# Patient Record
Sex: FEMALE | Race: WHITE | NOT HISPANIC OR LATINO | Employment: UNEMPLOYED | URBAN - METROPOLITAN AREA
[De-identification: names, ages, dates, MRNs, and addresses within clinical notes are randomized per-mention and may not be internally consistent; named-entity substitution may affect disease eponyms.]

---

## 2017-06-10 PROBLEM — T50.901A OVERDOSE: Status: ACTIVE | Noted: 2017-06-10

## 2017-06-11 PROBLEM — I95.9 HYPOTENSION: Status: ACTIVE | Noted: 2017-06-11

## 2017-06-11 PROBLEM — T50.902A INTENTIONAL DRUG OVERDOSE: Status: ACTIVE | Noted: 2017-06-10

## 2017-06-11 PROBLEM — Z72.0 TOBACCO ABUSE: Status: ACTIVE | Noted: 2017-06-11

## 2017-06-11 PROBLEM — R79.89 ELEVATED LFTS: Status: ACTIVE | Noted: 2017-06-11

## 2017-06-11 PROBLEM — I10 ESSENTIAL HYPERTENSION: Status: ACTIVE | Noted: 2017-06-11

## 2017-06-12 PROBLEM — F32.A DEPRESSION: Status: ACTIVE | Noted: 2017-06-12

## 2017-06-13 PROBLEM — F10.20 ALCOHOL DEPENDENCE, UNCOMPLICATED: Status: ACTIVE | Noted: 2017-06-13

## 2017-06-13 PROBLEM — I95.9 HYPOTENSION: Status: RESOLVED | Noted: 2017-06-11 | Resolved: 2017-06-13

## 2017-06-13 PROBLEM — F11.20 UNCOMPLICATED OPIOID DEPENDENCE: Status: ACTIVE | Noted: 2017-06-13

## 2017-06-13 PROBLEM — F41.9 ANXIETY DISORDER: Status: ACTIVE | Noted: 2017-06-13

## 2017-06-13 PROBLEM — F13.10 BENZODIAZEPINE ABUSE: Status: ACTIVE | Noted: 2017-06-13

## 2017-06-13 PROBLEM — F13.10 SEDATIVE ABUSE: Status: ACTIVE | Noted: 2017-06-13

## 2017-06-13 PROBLEM — G25.81 RLS (RESTLESS LEGS SYNDROME): Status: ACTIVE | Noted: 2017-06-13

## 2017-11-02 DIAGNOSIS — M54.2 CERVICALGIA: Primary | ICD-10-CM

## 2019-04-11 ENCOUNTER — OFFICE VISIT (OUTPATIENT)
Dept: PULMONOLOGY | Facility: CLINIC | Age: 54
End: 2019-04-11
Payer: MEDICAID

## 2019-04-11 VITALS
DIASTOLIC BLOOD PRESSURE: 76 MMHG | WEIGHT: 121.94 LBS | HEIGHT: 67 IN | OXYGEN SATURATION: 97 % | SYSTOLIC BLOOD PRESSURE: 120 MMHG | HEART RATE: 76 BPM | BODY MASS INDEX: 19.14 KG/M2

## 2019-04-11 DIAGNOSIS — R91.1 PULMONARY NODULE: ICD-10-CM

## 2019-04-11 DIAGNOSIS — J40 BRONCHITIS: ICD-10-CM

## 2019-04-11 DIAGNOSIS — F17.219 CIGARETTE NICOTINE DEPENDENCE WITH NICOTINE-INDUCED DISORDER: ICD-10-CM

## 2019-04-11 DIAGNOSIS — Z12.9 SCREENING FOR CANCER: ICD-10-CM

## 2019-04-11 PROBLEM — F17.210 DEPENDENCE ON NICOTINE FROM CIGARETTES: Status: ACTIVE | Noted: 2017-06-11

## 2019-04-11 PROCEDURE — 99406 BEHAV CHNG SMOKING 3-10 MIN: CPT | Mod: S$PBB,,, | Performed by: INTERNAL MEDICINE

## 2019-04-11 PROCEDURE — 99204 OFFICE O/P NEW MOD 45 MIN: CPT | Mod: 25,S$PBB,, | Performed by: INTERNAL MEDICINE

## 2019-04-11 PROCEDURE — 99999 PR PBB SHADOW E&M-EST. PATIENT-LVL IV: CPT | Mod: PBBFAC,,, | Performed by: INTERNAL MEDICINE

## 2019-04-11 PROCEDURE — 99406 PR TOBACCO USE CESSATION INTERMEDIATE 3-10 MINUTES: ICD-10-PCS | Mod: S$PBB,,, | Performed by: INTERNAL MEDICINE

## 2019-04-11 PROCEDURE — 99999 PR PBB SHADOW E&M-EST. PATIENT-LVL IV: ICD-10-PCS | Mod: PBBFAC,,, | Performed by: INTERNAL MEDICINE

## 2019-04-11 PROCEDURE — 99214 OFFICE O/P EST MOD 30 MIN: CPT | Mod: PBBFAC | Performed by: INTERNAL MEDICINE

## 2019-04-11 PROCEDURE — 99204 PR OFFICE/OUTPT VISIT, NEW, LEVL IV, 45-59 MIN: ICD-10-PCS | Mod: 25,S$PBB,, | Performed by: INTERNAL MEDICINE

## 2019-04-11 RX ORDER — CYPROHEPTADINE HYDROCHLORIDE 4 MG/1
TABLET ORAL
COMMUNITY

## 2019-04-11 RX ORDER — OXYCODONE AND ACETAMINOPHEN 10; 325 MG/1; MG/1
TABLET ORAL
COMMUNITY

## 2019-04-11 RX ORDER — VARENICLINE TARTRATE 1 MG/1
1 TABLET, FILM COATED ORAL 2 TIMES DAILY
Qty: 60 TABLET | Refills: 2 | Status: SHIPPED | OUTPATIENT
Start: 2019-04-11

## 2019-04-11 RX ORDER — FLUOXETINE HYDROCHLORIDE 20 MG/1
CAPSULE ORAL
COMMUNITY

## 2019-04-11 RX ORDER — CYPROHEPTADINE HYDROCHLORIDE 4 MG/1
TABLET ORAL
COMMUNITY
Start: 2019-04-09

## 2019-04-11 RX ORDER — IBUPROFEN 800 MG/1
TABLET ORAL
COMMUNITY

## 2019-04-11 RX ORDER — CLONAZEPAM 0.5 MG/1
TABLET ORAL
COMMUNITY

## 2019-04-11 RX ORDER — FLUOXETINE HYDROCHLORIDE 20 MG/1
20 CAPSULE ORAL DAILY
Refills: 5 | COMMUNITY
Start: 2019-04-04

## 2019-04-11 RX ORDER — HYDROCODONE BITARTRATE AND ACETAMINOPHEN 7.5; 325 MG/1; MG/1
TABLET ORAL
COMMUNITY

## 2019-04-11 RX ORDER — PRAVASTATIN SODIUM 20 MG/1
TABLET ORAL
Refills: 5 | COMMUNITY
Start: 2019-04-05

## 2019-04-11 RX ORDER — PRAVASTATIN SODIUM 20 MG/1
TABLET ORAL
COMMUNITY

## 2019-04-11 RX ORDER — IBUPROFEN 800 MG/1
800 TABLET ORAL 2 TIMES DAILY PRN
Refills: 2 | COMMUNITY
Start: 2019-03-21

## 2019-04-11 RX ORDER — VARENICLINE TARTRATE 0.5 (11)-1
KIT ORAL
Qty: 1 PACKAGE | Refills: 0 | Status: SHIPPED | OUTPATIENT
Start: 2019-04-11

## 2019-04-11 RX ORDER — HYDROCODONE BITARTRATE AND ACETAMINOPHEN 10; 300 MG/1; MG/1
TABLET ORAL
COMMUNITY

## 2019-04-11 RX ORDER — CLONAZEPAM 0.5 MG/1
TABLET ORAL
Refills: 1 | COMMUNITY
Start: 2019-03-25

## 2019-04-11 RX ORDER — HYDROCODONE BITARTRATE AND ACETAMINOPHEN 7.5; 325 MG/1; MG/1
TABLET ORAL
Refills: 0 | COMMUNITY
Start: 2019-02-08

## 2019-04-11 NOTE — LETTER
April 11, 2019      Liza Wihte NP  [address] [facility] [address]           [facility] - Pulmonary Services  [facility]  [place] [address]  Phone: [phone]          Patient: Rita Rolon   MR Number: [id number]   YOB: 1965   Date of Visit: 4/11/2019       Dear Liza White:    Thank you for referring Rita Rolon to me for evaluation. Attached you will find relevant portions of my assessment and plan of care.    If you have questions, please do not hesitate to call me. I look forward to following Rita Rolon along with you.    Sincerely,    Aleja Romano MD    Enclosure  CC:  No Recipients    If you would like to receive this communication electronically, please contact [email or web address] or [phone] to request more information on [employer] Link access.    For providers and/or their staff who would like to refer a patient to [facility], please contact us through our one-stop-shop provider referral line, Rox Foreman, at [phone].    If you feel you have received this communication in error or would no longer like to receive these types of communications, please e-mail [email or web address]

## 2019-04-11 NOTE — PROGRESS NOTES
"Subjective:       Patient ID: Rita Rolon is a 53 y.o. female.    Chief Complaint: Abnormal Ct Scan    53 year old current smoker who had a LDCT screen for lung cancer and a 4 mm nodule on the right was detected.  Patient rarely has dyspnea on exertion for which she uses an inhaler not even once a month.  Denies chronic cough or hemoptysis.    Review of Systems   Constitutional: Negative for weight loss and weight gain.   HENT: Negative for trouble swallowing.    Eyes: Negative for itching.   Respiratory: Positive for sputum production (morning brown, no change, no hemoptysis). Negative for cough and wheezing.    Cardiovascular: Negative for chest pain and leg swelling.   Genitourinary: Negative for difficulty urinating.   Endocrine: Negative for cold intolerance and heat intolerance.    Musculoskeletal: Negative for arthralgias.   Gastrointestinal: Negative for acid reflux.   Neurological: Negative for headaches.   Hematological: Negative for adenopathy.   Psychiatric/Behavioral: Negative for confusion.       Current smoker, motivated to quit.  Has used chantix in the past but was drinking, described vivid dreams.  Has a history of depression, that is not quite controlled  Objective:       Vitals:    04/11/19 1044   BP: 120/76   BP Location: Right arm   Patient Position: Sitting   Pulse: 76   SpO2: 97%   Weight: 55.3 kg (121 lb 14.6 oz)   Height: 5' 7" (1.702 m)     Physical Exam   Constitutional: She is oriented to person, place, and time. She appears well-developed and well-nourished.   HENT:   Head: Normocephalic.   Nose: Nose normal.   Neck: Normal range of motion. Neck supple. No tracheal deviation present.   Cardiovascular: Normal rate, regular rhythm, normal heart sounds and intact distal pulses.   Pulmonary/Chest: Normal expansion, symmetric chest wall expansion, effort normal and breath sounds normal.   Abdominal: Soft. Bowel sounds are normal. There is no hepatosplenomegaly.   Musculoskeletal: Normal " range of motion. She exhibits no edema.   Lymphadenopathy: No supraclavicular adenopathy is present.     She has no cervical adenopathy.   Neurological: She is alert and oriented to person, place, and time. No cranial nerve deficit.   Skin: Skin is warm and dry.   Psychiatric: She has a normal mood and affect.   Vitals reviewed.       Personal Diagnostic Review  CT of chest performed on 3/4/2019 without contrast revealed The largest opacity in the right lung appears solid and measures 0.4 cm on series 2, image 81.  No suspicious nodular mass identified on the left.  No definite findings of emphysema.  Mild bibasilar interstitial changes.     Pleura:   No effusion..     Heart and pericardium: Mild pericardial thickening versus effusion.     Aorta and vasculature: Multifocal aortic arch and coronary calcific atherosclerosis.     Chest wall and skeletal structures: Chronic appearing deformity of the right clavicle.  Prominent right axillary node, nonenlarged by short axis criteria.     Upper abdomen: Unremarkable.     IMPRESSION:      Lung-RADS Category:  2-Benign appearance- Annual screening with low-dose CT in 12 months.     Clinically or potentially clinically significant non lung cancer finding:  S - Significant     Prior Lung Cancer Modifier:  No history of prior lung cancer   .  No flowsheet data found.      Assessment:       1. Cigarette nicotine dependence with nicotine-induced disorder    2. Pulmonary nodule    3. Screening for cancer    4. Bronchitis        Outpatient Encounter Medications as of 4/11/2019   Medication Sig Dispense Refill    acetaminophen (TYLENOL) 325 MG tablet Take 2 tablets (650 mg total) by mouth every 6 (six) hours as needed (Temperature greater than or equal to 101 degrees or pain greater than 3 on 10 pain scale).  0    amlodipine (NORVASC) 5 MG tablet Take 5 mg by mouth once daily.  3    clonazePAM (KLONOPIN) 0.5 MG tablet TAKE 1 TABLET BY MOUTH UP TO 2 TIMES DAILY AS NEEDED  1     clonazePAM (KLONOPIN) 0.5 MG tablet clonazepam 0.5 mg tablet      cyclobenzaprine (FLEXERIL) 10 MG tablet       cyproheptadine (PERIACTIN) 4 mg tablet       cyproheptadine (PERIACTIN) 4 mg tablet cyproheptadine 4 mg tablet   Take 1 tablet twice a day by oral route.      FLUoxetine 20 MG capsule fluoxetine 20 mg capsule      FLUoxetine 20 MG capsule Take 20 mg by mouth once daily.  5    HYDROcodone-acetaminophen (NORCO) 7.5-325 mg per tablet TAKE 1-2 TABLETS EVERY SIX HOURS AS NEEDED FOR PAIN FOR 7 DAYS  0    HYDROcodone-acetaminophen (NORCO) 7.5-325 mg per tablet hydrocodone 7.5 mg-acetaminophen 325 mg tablet      HYDROcodone-acetaminophen  mg Tab hydrocodone 10 mg-acetaminophen 300 mg tablet   Take 1 tablet every 6 hours by oral route.      hydrOXYzine HCl (ATARAX) 50 MG tablet Take 1 tablet (50 mg total) by mouth nightly as needed for Itching (insomnia). 30 tablet 0     mg tablet Take 800 mg by mouth 2 (two) times daily as needed.  2    ibuprofen (IBU) 800 MG tablet  mg tablet      lisinopril (PRINIVIL,ZESTRIL) 20 MG tablet Take 20 mg by mouth 2 (two) times daily.  4    meloxicam (MOBIC) 7.5 MG tablet Take 1 tablet (7.5 mg total) by mouth once daily. 30 tablet 0    multivitamin (THERAGRAN) tablet Take 1 tablet by mouth once daily.      nicotine (NICODERM CQ) 14 mg/24 hr Place 1 patch onto the skin daily as needed (smoking).  0    oxyCODONE-acetaminophen (PERCOCET)  mg per tablet Percocet 10 mg-325 mg tablet   Take 1 tablet every 6 hours by oral route.      pravastatin (PRAVACHOL) 20 MG tablet pravastatin 20 mg tablet      pravastatin (PRAVACHOL) 20 MG tablet TAKE 1 TABLET BY MOUTH DAILY EACH EVENING  5    thiamine 100 MG tablet Take 1 tablet (100 mg total) by mouth once daily.      VENTOLIN HFA 90 mcg/actuation inhaler Inhale 2 puffs into the lungs every 4 (four) hours.  5    duloxetine (CYMBALTA) 60 MG capsule Take 1 capsule (60 mg total) by mouth once daily. 30  capsule 0    folic acid (FOLVITE) 1 MG tablet Take 1 tablet (1 mg total) by mouth once daily. 30 tablet 0    gabapentin (NEURONTIN) 400 MG capsule Take 2 capsules (800 mg total) by mouth 3 (three) times daily after meals. 180 capsule 0    mirtazapine (REMERON) 15 MG tablet Take 1 tablet (15 mg total) by mouth every evening. 30 tablet 0    ropinirole (REQUIP) 0.5 MG tablet Take 1 tablet (0.5 mg total) by mouth every evening. 30 tablet 0    varenicline (CHANTIX STARTING MONTH BOX) 0.5 mg (11)- 1 mg (42) tablet Take one 0.5mg tab by mouth once daily X3 days,then increase to one 0.5mg tab twice daily X4 days,then increase to one 1mg tab twice daily 1 Package 0    varenicline (CHANTIX) 1 mg Tab Take 1 tablet (1 mg total) by mouth 2 (two) times daily. 60 tablet 2     No facility-administered encounter medications on file as of 4/11/2019.      Orders Placed This Encounter   Procedures    CT Chest Lung Screening Low Dose     Standing Status:   Future     Standing Expiration Date:   4/10/2020    Ambulatory referral to Smoking Cessation Program     Referral Priority:   Routine     Referral Type:   Consultation     Referral Reason:   Specialty Services Required     Requested Specialty:   CTTS     Number of Visits Requested:   1     Plan:     Problem List Items Addressed This Visit     Dependence on nicotine from cigarettes    Overview     Counseled on quitting for five minutes.  Will refer back to smoking cessation and prescribe chantix, side effects discussed.  Will speak to psychiatrist tomorrow to ensure safety with depression.    Motivated to quit.         Relevant Medications    varenicline (CHANTIX STARTING MONTH BOX) 0.5 mg (11)- 1 mg (42) tablet    varenicline (CHANTIX) 1 mg Tab    Other Relevant Orders    Ambulatory referral to Smoking Cessation Program    Bronchitis    Overview     Currently asymptomatic  Albuterol as needed.         Pulmonary nodule    Overview     Detected on LDCT 3/2019.  4mm in greatest  diameter.  Follow up with continued LDCT of chest in one year.         Relevant Orders    CT Chest Lung Screening Low Dose      Other Visit Diagnoses     Screening for cancer        Relevant Orders    CT Chest Lung Screening Low Dose

## 2019-04-18 ENCOUNTER — TELEPHONE (OUTPATIENT)
Dept: SMOKING CESSATION | Facility: CLINIC | Age: 54
End: 2019-04-18

## 2019-04-18 NOTE — TELEPHONE ENCOUNTER
Called and spoke with patient to reschedule scon visit with tobacco cessation clinic today. Heavy weather anticipated at this time. Rescheduled to next Thursday 4/25 at `70.

## 2019-04-25 ENCOUNTER — CLINICAL SUPPORT (OUTPATIENT)
Dept: SMOKING CESSATION | Facility: CLINIC | Age: 54
End: 2019-04-25
Payer: COMMERCIAL

## 2019-04-25 DIAGNOSIS — F17.200 NICOTINE DEPENDENCE: ICD-10-CM

## 2019-04-25 PROCEDURE — 99404 PR PREVENT COUNSEL,INDIV,60 MIN: ICD-10-PCS | Mod: S$GLB,,, | Performed by: NURSE PRACTITIONER

## 2019-04-25 PROCEDURE — 99404 PREV MED CNSL INDIV APPRX 60: CPT | Mod: S$GLB,,, | Performed by: NURSE PRACTITIONER

## 2019-04-25 RX ORDER — DM/P-EPHED/ACETAMINOPH/DOXYLAM 30-7.5/3
2 LIQUID (ML) ORAL
Qty: 81 LOZENGE | Refills: 0 | Status: SHIPPED | OUTPATIENT
Start: 2019-04-25 | End: 2019-05-25

## 2019-04-25 RX ORDER — IBUPROFEN 200 MG
1 TABLET ORAL DAILY
Qty: 14 PATCH | Refills: 0 | Status: SHIPPED | OUTPATIENT
Start: 2019-04-25 | End: 2019-05-09

## 2019-04-25 NOTE — PROGRESS NOTES
See Tobacco Cessation Intake Form for patient assessment and recommendations.  Exhaled carbon monoxide level was 19 ppm per [employer].

## 2019-04-25 NOTE — Clinical Note
Pt seen at intake last thursday. She currently smokes 30 cigs/day. Discussed tobacco cessation medication of 21 mg nicotine patch QD and 2 mg nicotine lozenge PRN (1-2 per hour in place of cigarettes). Pt started on rate reduction and wait time of 15 min prior to smoking. Exhaled carbon monoxide level was 19 (0-6 non-smoker). Will see pt back in office in 1 wk.

## 2019-05-30 ENCOUNTER — TELEPHONE (OUTPATIENT)
Dept: SMOKING CESSATION | Facility: CLINIC | Age: 54
End: 2019-05-30

## 2019-05-30 NOTE — TELEPHONE ENCOUNTER
Pt has not been coming to tobacco cessation visits. Called to see if she wished to continue with program. She states that she has had problems and was not really ready to attempt a quit. She states she is ready now. Rescheduled her for 6/6/19 at 1800.

## 2019-11-13 ENCOUNTER — TELEPHONE (OUTPATIENT)
Dept: SMOKING CESSATION | Facility: CLINIC | Age: 54
End: 2019-11-13

## 2019-11-20 ENCOUNTER — TELEPHONE (OUTPATIENT)
Dept: SMOKING CESSATION | Facility: CLINIC | Age: 54
End: 2019-11-20

## 2019-11-20 ENCOUNTER — CLINICAL SUPPORT (OUTPATIENT)
Dept: SMOKING CESSATION | Facility: CLINIC | Age: 54
End: 2019-11-20
Payer: COMMERCIAL

## 2019-11-20 DIAGNOSIS — F17.200 NICOTINE DEPENDENCE: Primary | ICD-10-CM

## 2019-11-20 PROCEDURE — 99407 PR TOBACCO USE CESSATION INTENSIVE >10 MINUTES: ICD-10-PCS | Mod: S$GLB,,, | Performed by: INTERNAL MEDICINE

## 2019-11-20 PROCEDURE — 99407 BEHAV CHNG SMOKING > 10 MIN: CPT | Mod: S$GLB,,, | Performed by: INTERNAL MEDICINE

## 2019-11-20 NOTE — PROGRESS NOTES
Spoke with patient today in regard to smoking cessation progress for 3/6 month telephone follow up, she states not tobacco free. Patient has scheduled an appointment to return to the program for quit attempt #2 on 1/6/2020 @ 12:00 pm. Informed patient of benefit period, future follow ups, and contact information if any further help or support is needed. Will resolve episode and complete smart form for Quit attempt #1.

## 2020-01-06 ENCOUNTER — TELEPHONE (OUTPATIENT)
Dept: SMOKING CESSATION | Facility: CLINIC | Age: 55
End: 2020-01-06

## 2020-01-21 ENCOUNTER — TELEPHONE (OUTPATIENT)
Dept: SMOKING CESSATION | Facility: CLINIC | Age: 55
End: 2020-01-21

## 2020-01-21 NOTE — TELEPHONE ENCOUNTER
Contact made second missed SCON.  Patient stated she is not ready to join the program and will call us when she is ready.  No further contact will be made by me.   n/a

## 2020-05-18 ENCOUNTER — CLINICAL SUPPORT (OUTPATIENT)
Dept: SMOKING CESSATION | Facility: CLINIC | Age: 55
End: 2020-05-18
Payer: COMMERCIAL

## 2020-05-18 DIAGNOSIS — F17.200 NICOTINE DEPENDENCE: Primary | ICD-10-CM

## 2020-05-18 PROBLEM — M18.12 ARTHRITIS OF CARPOMETACARPAL (CMC) JOINT OF LEFT THUMB: Status: ACTIVE | Noted: 2020-05-18

## 2020-05-18 PROCEDURE — 99407 PR TOBACCO USE CESSATION INTENSIVE >10 MINUTES: ICD-10-PCS | Mod: S$GLB,,,

## 2020-05-18 PROCEDURE — 99999 PR PBB SHADOW E&M-EST. PATIENT-LVL I: ICD-10-PCS | Mod: PBBFAC,,,

## 2020-05-18 PROCEDURE — 99999 PR PBB SHADOW E&M-EST. PATIENT-LVL I: CPT | Mod: PBBFAC,,,

## 2020-05-18 PROCEDURE — 99407 BEHAV CHNG SMOKING > 10 MIN: CPT | Mod: S$GLB,,,

## 2020-05-18 NOTE — PROGRESS NOTES
Spoke with patient today in regard to smoking cessation progress for 12 month telephone follow up on quit 1. Patient states that she is not tobacco free at this time. Not interested in making an appointment.  Informed patient of benefit period, future follow up, and contact information if any further help or support is needed. Will complete / resolve smart form for 12 month follow up on Quit attempt #1.

## 2021-06-03 PROBLEM — Z78.9 IMPAIRED MOBILITY AND ACTIVITIES OF DAILY LIVING: Status: ACTIVE | Noted: 2021-06-03

## 2021-06-03 PROBLEM — Z74.09 IMPAIRED MOBILITY AND ACTIVITIES OF DAILY LIVING: Status: ACTIVE | Noted: 2021-06-03

## 2021-06-23 PROBLEM — M54.2 NECK PAIN, CHRONIC: Status: ACTIVE | Noted: 2021-06-23

## 2021-06-23 PROBLEM — Z74.09 DECREASED STRENGTH, ENDURANCE, AND MOBILITY: Status: ACTIVE | Noted: 2021-06-23

## 2021-06-23 PROBLEM — G89.29 NECK PAIN, CHRONIC: Status: ACTIVE | Noted: 2021-06-23

## 2021-06-23 PROBLEM — R53.1 DECREASED STRENGTH, ENDURANCE, AND MOBILITY: Status: ACTIVE | Noted: 2021-06-23

## 2021-06-23 PROBLEM — R68.89 DECREASED STRENGTH, ENDURANCE, AND MOBILITY: Status: ACTIVE | Noted: 2021-06-23

## 2024-04-16 ENCOUNTER — HOSPITAL ENCOUNTER (EMERGENCY)
Facility: HOSPITAL | Age: 59
Discharge: HOME | End: 2024-04-16
Attending: EMERGENCY MEDICINE
Payer: MEDICARE

## 2024-04-16 VITALS
HEART RATE: 80 BPM | WEIGHT: 177 LBS | RESPIRATION RATE: 18 BRPM | OXYGEN SATURATION: 95 % | HEIGHT: 66 IN | BODY MASS INDEX: 28.45 KG/M2 | DIASTOLIC BLOOD PRESSURE: 64 MMHG | SYSTOLIC BLOOD PRESSURE: 124 MMHG

## 2024-04-16 DIAGNOSIS — S49.92XA UPPER EXTREMITY INJURY, LEFT, INITIAL ENCOUNTER: Primary | ICD-10-CM

## 2024-04-16 PROCEDURE — 25000003 PHARM REV CODE 250: Performed by: EMERGENCY MEDICINE

## 2024-04-16 PROCEDURE — 99283 EMERGENCY DEPT VISIT LOW MDM: CPT | Mod: 25

## 2024-04-16 RX ORDER — IBUPROFEN 400 MG/1
800 TABLET ORAL
Status: COMPLETED | OUTPATIENT
Start: 2024-04-16 | End: 2024-04-16

## 2024-04-16 RX ADMIN — IBUPROFEN 800 MG: 400 TABLET ORAL at 01:04
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
## 2024-04-16 NOTE — DISCHARGE INSTRUCTIONS
You don't have evidence of broken bones. You can take Tylenol 1 gram every 8 hours, and ibuprofen 800 mg every 8 hours; this means you can take pain medicine once every 4 hours.    If you have continued or worsening pain, come back to the hospital for repeat imaging. Sometimes breaks don't [place]  [address] [facility] [address]  [phone]    Schedule an appointment as soon as possible for a visit in 3 days

## 2024-04-16 NOTE — Clinical Note
Brody Pete accompanied their caregiver to the emergency department on 4/16/2024. They may return to work on 04/17/2024.      If you have any questions or concerns, please don't hesitate to call.      Milagros MILLER
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
## 2025-02-17 ENCOUNTER — HOSPITAL ENCOUNTER (EMERGENCY)
Facility: HOSPITAL | Age: 60
Discharge: HOME | End: 2025-02-17
Attending: STUDENT IN AN ORGANIZED HEALTH CARE EDUCATION/TRAINING PROGRAM
Payer: MEDICARE

## 2025-02-17 VITALS
SYSTOLIC BLOOD PRESSURE: 140 MMHG | RESPIRATION RATE: 20 BRPM | HEART RATE: 68 BPM | WEIGHT: 110 LBS | HEIGHT: 67 IN | BODY MASS INDEX: 17.27 KG/M2 | OXYGEN SATURATION: 95 % | TEMPERATURE: 98 F | DIASTOLIC BLOOD PRESSURE: 63 MMHG

## 2025-02-17 DIAGNOSIS — S61.219A SUPERFICIAL LACERATION OF FINGER: Primary | ICD-10-CM

## 2025-02-17 DIAGNOSIS — S69.91XA HAND TRAUMA, RIGHT, INITIAL ENCOUNTER: ICD-10-CM

## 2025-02-17 PROCEDURE — 63600175 PHARM REV CODE 636 W HCPCS: Performed by: STUDENT IN AN ORGANIZED HEALTH CARE EDUCATION/TRAINING PROGRAM

## 2025-02-17 PROCEDURE — 90471 IMMUNIZATION ADMIN: CPT | Performed by: STUDENT IN AN ORGANIZED HEALTH CARE EDUCATION/TRAINING PROGRAM

## 2025-02-17 PROCEDURE — 12001 RPR S/N/AX/GEN/TRNK 2.5CM/<: CPT

## 2025-02-17 PROCEDURE — 90715 TDAP VACCINE 7 YRS/> IM: CPT | Performed by: STUDENT IN AN ORGANIZED HEALTH CARE EDUCATION/TRAINING PROGRAM

## 2025-02-17 PROCEDURE — 99284 EMERGENCY DEPT VISIT MOD MDM: CPT | Mod: 25

## 2025-02-17 RX ORDER — BACITRACIN ZINC 500 UNIT/G
OINTMENT (GRAM) TOPICAL 2 TIMES DAILY
Qty: 30 G | Refills: 0 | Status: SHIPPED | OUTPATIENT
Start: 2025-02-17

## 2025-02-17 RX ORDER — LIDOCAINE HYDROCHLORIDE 10 MG/ML
5 INJECTION, SOLUTION EPIDURAL; INFILTRATION; INTRACAUDAL; PERINEURAL
Status: COMPLETED | OUTPATIENT
Start: 2025-02-17 | End: 2025-02-17

## 2025-02-17 RX ADMIN — CLOSTRIDIUM TETANI TOXOID ANTIGEN (FORMALDEHYDE INACTIVATED), CORYNEBACTERIUM DIPHTHERIAE TOXOID ANTIGEN (FORMALDEHYDE INACTIVATED), BORDETELLA PERTUSSIS TOXOID ANTIGEN (GLUTARALDEHYDE INACTIVATED), BORDETELLA PERTUSSIS FILAMENTOUS HEMAGGLUTININ ANTIGEN (FORMALDEHYDE INACTIVATED), BORDETELLA PERTUSSIS PERTACTIN ANTIGEN, AND BORDETELLA PERTUSSIS FIMBRIAE 2/3 ANTIGEN 0.5 ML: 5; 2; 2.5; 5; 3; 5 INJECTION, SUSPENSION INTRAMUSCULAR at 11:02

## 2025-02-17 RX ADMIN — LIDOCAINE HYDROCHLORIDE 50 MG: 10 INJECTION, SOLUTION EPIDURAL; INFILTRATION; INTRACAUDAL at 12:02
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
## 2025-02-17 NOTE — DISCHARGE INSTRUCTIONS
Keep the wound clean and dry  Don't submerge in water like in a bath. A shower is ok - pat dry.   Antibiotic ointment on the wound once or twice a day as prescribed  Sutures out in 7 days  Return to ER with worsening hand pain, drainage from wound, redness to wound, fever, or any other symptoms

## 2025-07-18 ENCOUNTER — HOSPITAL ENCOUNTER (EMERGENCY)
Facility: HOSPITAL | Age: 60
Discharge: HOME | End: 2025-07-18
Attending: STUDENT IN AN ORGANIZED HEALTH CARE EDUCATION/TRAINING PROGRAM
Payer: MEDICARE

## 2025-07-18 VITALS
HEART RATE: 83 BPM | SYSTOLIC BLOOD PRESSURE: 127 MMHG | OXYGEN SATURATION: 96 % | HEIGHT: 67 IN | DIASTOLIC BLOOD PRESSURE: 89 MMHG | TEMPERATURE: 98 F | WEIGHT: 110 LBS | BODY MASS INDEX: 17.27 KG/M2 | RESPIRATION RATE: 18 BRPM

## 2025-07-18 DIAGNOSIS — S01.01XA OCCIPITAL SCALP LACERATION, INITIAL ENCOUNTER: ICD-10-CM

## 2025-07-18 DIAGNOSIS — S09.90XA TRAUMATIC INJURY OF HEAD, INITIAL ENCOUNTER: Primary | ICD-10-CM

## 2025-07-18 PROCEDURE — 99284 EMERGENCY DEPT VISIT MOD MDM: CPT | Mod: 25

## 2025-07-18 PROCEDURE — 12002 RPR S/N/AX/GEN/TRNK2.6-7.5CM: CPT

## 2025-07-18 PROCEDURE — 63600175 PHARM REV CODE 636 W HCPCS

## 2025-07-18 RX ORDER — LIDOCAINE HYDROCHLORIDE 10 MG/ML
5 INJECTION, SOLUTION EPIDURAL; INFILTRATION; INTRACAUDAL; PERINEURAL
Status: COMPLETED | OUTPATIENT
Start: 2025-07-18 | End: 2025-07-18

## 2025-07-18 RX ADMIN — LIDOCAINE HYDROCHLORIDE 50 MG: 10 INJECTION, SOLUTION EPIDURAL; INFILTRATION; INTRACAUDAL; PERINEURAL at 08:07
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
## 2025-07-19 NOTE — ED NOTES
Pt ambulate to restroom.  RN present during ambulation for fall precaution.  Pt gait slightly unsteady but maintained without assistance.  Family at bedside

## 2025-07-19 NOTE — DISCHARGE INSTRUCTIONS
Today you were seen in the emergency room for head injury.  After evaluation, it was determined that you likely do not have a brain bleed or other life threatening condition. However, you may still have a concussion, which is minor injury to the brain after injury.    Symptoms that you may experience over the next few days include sensitivity to light and sound, headache, drowsiness, feeling foggy, attention or concentration problems, sleeping more than usual, sleeping you less than usual, anxiety, among others.  You should be on the lookout for dangerous symptoms such as vision problems/loss, trouble with coordination, trouble with speech or swallowing, vomiting, or weakness or numbness on one side of the body.  Return to the Emergency Room ASAP if you start to develop any of these symptoms.    Over the next few days, you will need to rest.  Once you start to feel better, you can return to normal activity as you can tolerate.  Avoid activities that make your symptoms worse.      To control your headache at home you can try Ibuprofen or Tylenol as directed. You may also use ice therapy to assist with pain by using an ice pack, cold eye mask, or ice head wrap.    Thank you for allowing me and my emergency team to take care of you here today! I hope you feel better soon. Please do not hesitate to return with any additional concerns that may arise from this or any new problem you encounter.    Our goal in the emergency department is to always give you outstanding care and exceptional service. If you receive a survey by mail or e-mail in the next week regarding your experience in our ED, we would greatly appreciate you completing it. Your feedback provides us with a way to recognize our staff who give very good care and it helps us learn how to improve when your experience was below the excellence we aspire to be!    Brook Juneau, PA-C [facility]   Emergency Room Physician  Assistant

## 2025-07-19 NOTE — ED NOTES
Wound to back of head cleaned per ERP request.  Approx 2cm lac noted to occipital head.  Pt tolerated well.  Warm blankets provided to pt and mother.  Updated pt on plan of care.
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
## 2025-07-19 NOTE — ED NOTES
ERP at bedside to staple head lacs.  Additional lac noted occipital head.  Lac approx 1cm in length.  Wound cleaned.

## 2025-07-19 NOTE — ED NOTES
Pt states she is tired of waiting and threatens to leave.  Pt updated on status.  Pt denies any further requests at this time.  ERP updated

## 2025-07-28 ENCOUNTER — HOSPITAL ENCOUNTER (EMERGENCY)
Facility: HOSPITAL | Age: 60
Discharge: HOME | End: 2025-07-28
Attending: EMERGENCY MEDICINE
Payer: MEDICARE

## 2025-07-28 VITALS
TEMPERATURE: 98 F | RESPIRATION RATE: 18 BRPM | WEIGHT: 110 LBS | HEIGHT: 67 IN | BODY MASS INDEX: 17.27 KG/M2 | DIASTOLIC BLOOD PRESSURE: 73 MMHG | HEART RATE: 84 BPM | SYSTOLIC BLOOD PRESSURE: 147 MMHG | OXYGEN SATURATION: 97 %

## 2025-07-28 DIAGNOSIS — Z48.02 ENCOUNTER FOR STAPLE REMOVAL: Primary | ICD-10-CM

## 2025-07-28 PROCEDURE — 99999 HC NO LEVEL OF SERVICE - ED ONLY: CPT
